# Patient Record
(demographics unavailable — no encounter records)

---

## 2020-01-01 NOTE — PRM.DC
DISCHARGE SUMMARY


DISCHARGE SUMMARY


DATE OF ADMISSION: 2020





DATE OF DISCHARGE: 2020





ADMITTING DIAGNOSES: 37 weeks gestational age baby boy





DISCHARGE DIAGNOSES: 37 weeks gestational age baby boy





HOSPITAL COURSE: Baby is a 37 weeks gestational age by Dele[place] baby boy born via

vaginal delivery to a now G4, P4 mom.  He was actually born at home and mom was 

taken to the ER and was admitted to OB where I took over the care for the baby. 

Mom had no prenatal care.  Apgars were adequate and baby actually had no acute 

issues when he was born.  I did a partial sepsis work-up on the baby and 

everything was normal.  He had initial problems with feedings in the first 24 

hours but he improved after that and has had no issues with feedings afterwards.

 Mom has a history of meth use so CPS was called and they have decided to take 

the baby in their custody.  Dr. Birmingham did do a circumcision for him.  At this 

time baby is being discharged to CPS.  24-hour bili Manuel levels were within 

normal limits and  screen was done.





DIET: Formula feeding





MEDICATIONS: None





FOLLOW-UP: Follow-up with a primary care provider within a week.











AMANDA KING MD              2020 19:50

## 2020-01-01 NOTE — DIREP
PROCEDURE:CHEST 2 VIEWS

 

COMPARISON:None.

 

INDICATIONS:Retractions

 

FINDINGS:

LUNGS/PLEURA:The lungs are hyperexpanded.  No pneumothorax, pleural effusion, 

or atelectasis.

VASCULATURE:Normal.  Unremarkable pulmonary vasculature.

CARDIAC:Normal.  No cardiac silhouette abnormality or cardiomegaly. 

MEDIASTINUM:Normal.  No visible mass or adenopathy. 

BONES:Normal.  No fracture or visible bony lesion. 

OTHER:Negative.  

 

CONCLUSION:Mild hyperexpansion of the lungs.  Otherwise, negative exam

 

 

 

Dictated by: JAY Vasquez M.D. on 2020 at 07:01 AM     

Electronically Signed By: JAY Vasquez M.D. on 2020 at 07:02 AM

## 2020-01-01 NOTE — PCM.HP
HISTORY & PHYSICAL


HISTORY & PHYSICAL


DATE: 2020





Baby is being admitted to L&D as an inpatient.





ADMITTING DIAGNOSES: [place] baby boy born around 37 weeks gestational age





ADMIT NOTE: Baby boy was born at home to a now G4, P4 mom.  Mom had no prenatal 

care.  She reports delivering at around 3 to 3:30 in the morning and she brought

her baby to the ER shortly thereafter.  Again we have no labs and no prenatal 

records at this point to review.





ALLERGIES: No known drug allergies





PHYSICAL EXAMINATION:


VITAL SIGNS: There were no recorded vital signs at this point.  It was reported 

that his respiratory rate was up to 80 when he was brought to L&D.


HEENT: Anterior fontanelle soft and flat, oropharynx is clear, moist mucous 

membranes


NECK: Supple


HEART: S1 and S2 audible


LUNGS: CTA bilaterally


ABDOMEN: Bowel sounds present, soft abdomen, three-vessel cord


EXTREMITIES: Moving all extremities well, no overt jaundice, negative hip click





LABORATORY DATA: WBC 16.1, hemoglobin 16.8, platelet count 364, Accu-Chek 66





Chest x-ray revealed no acute infiltrates, no pneumothorax





ASSESSMENT: [place] baby boy born at home who is at 37 weeks gestational age by 

Deleon score





PLAN: I will review mom's labs that are drawn now and watch her baby for a 

minimum of 48 hours.  A partial sepsis work-up has been started and I will 

continue this work-up while we are watching baby in the hospital.  Mom is 

breast-feeding and supplementing at this point.











AMANDA KING MD              2020 10:21

## 2020-01-01 NOTE — PRM.PN
Subjective


Subjective


Date:  2020


Time:  09:15


Subjective


feeding good; a bit more yellow today





VTE


VTE Risk Score


VTE Risk:


Score 0-1 = Low Risk


(Aggressive mobilization; early ambulation; no VTE prophylaxis required)


Score 2: Moderate Risk


(Intermittent/Pneumatic Compression Device OR Lovenox/Heparin/Coumadin)


Score 3-4: High Risk


(Intermittent/Pneumatic Compression Device AND Lovenox/Heparin/Coumadin)


Score  > or =5: Highest Risk


(Intermittent/Pneumatic Compression Device AND Lovenox/Heparin/Coumadin)


Antico:Hep/LMWH/Coum/Xarelto:  No


Mechanical device ordered:  No





Review of Systems


Constitutional:  No: Fever


Eyes:  No: Conjunctivae inflammation, Eyelid inflammation


ENT:  No: Nose discharge, Nose congestion


Respiratory:  No: Cough, Wheezing


Gastrointestinal:  No: Vomiting, Diarrhea


Skin:  Jaundice; No: Rash, Lesions


Neurological:  No: Seizures


Allergies:  


Coded Allergies:  


     No Known Allergies (Unverified , 20)





Objective


General:  No acute distress


HEENT:  Atraumatic, PERRLA, Mucous membr. moist/pink


Neck:  Supple


Lungs:  Clear to auscultation, Normal air movement


Heart:  Regular rate, Normal S1, Normal S2


Abdomen:  Normal bowel sounds, Soft


Extremities:  No clubbing, No cyanosis


Skin:  No rashes, No breakdown, No significant lesion


All Results(Lab/Rad)





Laboratory Tests








Test


 20


18:20 20


19:15 20


22:36 20


02:25


 


White Blood Count 18.7 10^3/uL    


 


Red Blood Count 4.72 10^6/uL    


 


Hemoglobin 18.3 g/dL    


 


Hematocrit 51.5 %    


 


Mean Corpuscular Volume 109.1 fL    


 


Mean Corpuscular Hemoglobin 38.8 pg    


 


Mean Corpuscular Hemoglobin


Concent 35.5 g/dL 


 


 


 





 


Red Cell Distribution Width 16.9 %    


 


Platelet Count 379 10^3/uL    


 


Mean Platelet Volume 9.8 fL    


 


Neutrophils (%) (Auto) 57.8 %    


 


Lymphocytes (%) (Auto) 31.2 %    


 


Monocytes (%) (Auto) 9.0 %    


 


Neutrophils # (Auto) 10.8 10^3/uL    


 


Lymphocytes # (Auto) 5.83 10^3/uL1    


 


Monocytes # (Auto) 1.7 10^3/uL    


 


Absolute Immature Granulocyte


(auto 0.20 10^3 u/L 


 


 


 





 


Absolute Eosinophils (auto) 0.1 10^3/uL    


 


Immature Granulocytes % 1.10 %    


 


Eosinophils % 0.4 %    


 


Basophils % 0.5 %    


 


Basophils # 0.1 10^3/uL    


 


Bedside Glucose  54  59  71 


 


Test


 20


04:58 20


07:48 


 





 


Bedside Glucose 53    


 


 Total Bilirubin  4.9 mg/dL   


 


 Direct Bilirubin  0.1 mg/dL   


 


 Indirect Bilirubin  4.8 mg/dL   


 


Phenylalanine PKU 


Screen 


 . 


 


 











Current Medications








 Medications


  (Trade)  Dose


 Ordered  Sig/Tana


 Route


 PRN Reason  Start Time


 Stop Time Status Last Admin


Dose Admin


 


 Erythromycin


  (Erythromycin)  1 gm  OT


 OP


   20 06:30


 3/24/20 06:29  20 10:56





 


 Hepatitis B


 Vaccine


  (Recombivax Hb 5


 Mcg/0.5 ml Syr)  5 mcg  ONCE ONCE


 IM


   20 06:30


 20 10:58 DC  





 


 Hepatitis B


 Vaccine


  (Engerix-B 10


 Mcg/0.5 ml Ped Vl)  10 mcg  STK-MED ONCE


 IM


   20 11:47


 20 11:48 Cancel  





 


 Lidocaine HCl


  (Lidocaine 1%


 Vial)  200 mg  STK-MED ONCE


 .ROUTE


   20 11:43


 20 11:44 DC  














Course


Vitals & review Data





Laboratory Tests








Test


 20


06:27 20


06:32 20


10:26 20


18:20


 


Bedside Glucose 66   57  


 


White Blood Count  16.1 10^3/uL   18.7 10^3/uL 


 


Red Blood Count  4.30 10^6/uL   4.72 10^6/uL 


 


Hemoglobin  16.8 g/dL   18.3 g/dL 


 


Hematocrit  49.2 %   51.5 % 


 


Mean Corpuscular Volume  114.4 fL   109.1 fL 


 


Mean Corpuscular Hemoglobin  39.1 pg   38.8 pg 


 


Mean Corpuscular Hemoglobin


Concent 


 34.1 g/dL 


 


 35.5 g/dL 





 


Red Cell Distribution Width  17.2 %   16.9 % 


 


Platelet Count  364 10^3/uL   379 10^3/uL 


 


Mean Platelet Volume  9.6 fL   9.8 fL 


 


Segmented Neutrophils  60 %   


 


Lymphocytes  22 %   


 


Monocytes  15 %   


 


Absolute Eosinophils (Manual)  3 %   


 


Nucleated Red Blood Cells  6 %   


 


Platelet Estimate  ADEQUATE   


 


Platelet Morphology  NORMAL   


 


Blood Morphology Comment


 


 NORMAL


MORPHOLOGY 


 





 


Neutrophils (%) (Auto)    57.8 % 


 


Lymphocytes (%) (Auto)    31.2 % 


 


Monocytes (%) (Auto)    9.0 % 


 


Neutrophils # (Auto)    10.8 10^3/uL 


 


Lymphocytes # (Auto)    5.83 10^3/uL1 


 


Monocytes # (Auto)    1.7 10^3/uL 


 


Absolute Immature Granulocyte


(auto 


 


 


 0.20 10^3 u/L 





 


Absolute Eosinophils (auto)    0.1 10^3/uL 


 


Immature Granulocytes %    1.10 % 


 


Eosinophils %    0.4 % 


 


Basophils %    0.5 % 


 


Basophils #    0.1 10^3/uL 


 


Test


 20


19:15 20


22:36 20


02:25 20


04:58


 


Bedside Glucose 54  59  71  53 


 


Test


 20


07:48 


 


 





 


 Total Bilirubin 4.9 mg/dL    


 


 Direct Bilirubin 0.1 mg/dL    


 


 Indirect Bilirubin 4.8 mg/dL    


 


Phenylalanine PKU [place]


Screen . 


 


 


 











                               Current Medications








 Medications


  (Trade)  Dose


 Ordered  Sig/Tana


 PRN Reason  Start Time


 Stop Time Status Last Admin


 


 Erythromycin


  (Erythromycin)  1 gm  OT


   20 06:30


 3/24/20 06:29  20 10:56














Assessment/Plan


37 WGA infant boy with resolved hypoglycemia





- bili at 4.4 initially; will repeat bili this morning


- routine NB care


- CPS is involved with mom having meth use and no prenatal care











AMANDA KING MD              2020 11:13

## 2020-01-01 NOTE — PRP
DATE OF PROCEDURE:  2020



PREOPERATIVE DIAGNOSIS:  Uncircumcised male infant.



POSTOPERATIVE DIAGNOSIS:  Uncircumcised male infant.



PROCEDURE:  Circumcision with Mogen clamp.



SURGEON:  Dr. Birmingham.



ANESTHESIA:  Dorsal penile block.



BLOOD LOSS:  Minimal.



COMPLICATIONS:  None.



DISPOSITION:  Infant stable in  nursery.  The patient was brought to the

 nursery where a timeout was performed.  We proceeded to prep and drape

the sterile area in the normal fashion.  Penis was inspected and no anomalies

were noted.  Dorsal penile block was done with 1% lidocaine without epinephrine.

 Then, grasped the foreskin at the 3 and 9 o'clock position with the curved

hemostats, the straight hemostat was placed at the 12 o'clock position and the

tissue was crushed.  The Mogen clamp was placed directly underneath the 12

o'clock hemostat and closed to allow for adequate hemostasis.  The foreskin was

removed with the scalpel.  The Mogen clamp was removed and the glans protruded

through the foreskin without issue.  Minimal amount of bleeding was noted. 

Hemostasis was noted throughout.  Blunt probe was used to take down adhesions. 

The patient tolerated the procedure well and remained in the  nursery for

recovery.





______________________________

Giovana Birmingham DO



DR:  ROXANE/loco  JOB# [id number]  [id number]

DD:  2020 12:07  DT:  2020 19:41

## 2020-01-01 NOTE — PRM.PN
Subjective


Subjective


Date:  2020


Time:  10:45


Subjective


Baby is feeding well; no acute issues





VTE


VTE Risk Score


VTE Risk:


Score 0-1 = Low Risk


(Aggressive mobilization; early ambulation; no VTE prophylaxis required)


Score 2: Moderate Risk


(Intermittent/Pneumatic Compression Device OR Lovenox/Heparin/Coumadin)


Score 3-4: High Risk


(Intermittent/Pneumatic Compression Device AND Lovenox/Heparin/Coumadin)


Score  > or =5: Highest Risk


(Intermittent/Pneumatic Compression Device AND Lovenox/Heparin/Coumadin)


Antico:Hep/LMWH/Coum/Xarelto:  No


Mechanical device ordered:  No





Review of Systems


Constitutional:  No: Fever


Eyes:  No: Conjunctivae inflammation, Eyelid inflammation


ENT:  No: Nose discharge, Nose congestion


Respiratory:  No: Cough, Wheezing


Gastrointestinal:  No: Vomiting, Diarrhea


Skin:  No: Rash, Lesions, Jaundice


Neurological:  No: Seizures


Allergies:  


Coded Allergies:  


     No Known Allergies (Unverified , 20)





Objective


General:  No acute distress


HEENT:  Atraumatic, PERRLA, Mucous membr. moist/pink


Neck:  Supple


Lungs:  Clear to auscultation, Normal air movement


Heart:  Regular rate, Normal S1, Normal S2


Abdomen:  Normal bowel sounds, Soft


Extremities:  No clubbing, No cyanosis


Skin:  No rashes, No breakdown, No significant lesion


All Results(Lab/Rad)





Laboratory Tests








Test


 20


18:20 20


19:15 20


22:36 20


02:25


 


White Blood Count 18.7 10^3/uL    


 


Red Blood Count 4.72 10^6/uL    


 


Hemoglobin 18.3 g/dL    


 


Hematocrit 51.5 %    


 


Mean Corpuscular Volume 109.1 fL    


 


Mean Corpuscular Hemoglobin 38.8 pg    


 


Mean Corpuscular Hemoglobin


Concent 35.5 g/dL 


 


 


 





 


Red Cell Distribution Width 16.9 %    


 


Platelet Count 379 10^3/uL    


 


Mean Platelet Volume 9.8 fL    


 


Neutrophils (%) (Auto) 57.8 %    


 


Lymphocytes (%) (Auto) 31.2 %    


 


Monocytes (%) (Auto) 9.0 %    


 


Neutrophils # (Auto) 10.8 10^3/uL    


 


Lymphocytes # (Auto) 5.83 10^3/uL1    


 


Monocytes # (Auto) 1.7 10^3/uL    


 


Absolute Immature Granulocyte


(auto 0.20 10^3 u/L 


 


 


 





 


Absolute Eosinophils (auto) 0.1 10^3/uL    


 


Immature Granulocytes % 1.10 %    


 


Eosinophils % 0.4 %    


 


Basophils % 0.5 %    


 


Basophils # 0.1 10^3/uL    


 


Bedside Glucose  54  59  71 


 


Test


 20


04:58 20


07:48 


 





 


Bedside Glucose 53    


 


 Total Bilirubin  4.9 mg/dL   


 


 Direct Bilirubin  0.1 mg/dL   


 


 Indirect Bilirubin  4.8 mg/dL   


 


Phenylalanine PKU 


Screen 


 . 


 


 











Current Medications








 Medications


  (Trade)  Dose


 Ordered  Sig/Tana


 Route


 PRN Reason  Start Time


 Stop Time Status Last Admin


Dose Admin


 


 Erythromycin


  (Erythromycin)  1 gm  OT


 OP


   20 06:30


 3/24/20 06:29  20 10:56





 


 Hepatitis B


 Vaccine


  (Recombivax Hb 5


 Mcg/0.5 ml Syr)  5 mcg  ONCE ONCE


 IM


   20 06:30


 20 10:58 DC  





 


 Hepatitis B


 Vaccine


  (Engerix-B 10


 Mcg/0.5 ml Ped Vl)  10 mcg  STK-MED ONCE


 IM


   20 11:47


 20 11:48 Cancel  





 


 Lidocaine HCl


  (Lidocaine 1%


 Vial)  200 mg  STK-MED ONCE


 .ROUTE


   20 11:43


 20 11:44 DC  














Course


Vitals & review Data





Laboratory Tests








Test


 20


06:27 20


06:32 20


10:26 20


18:20


 


Bedside Glucose 66   57  


 


White Blood Count  16.1 10^3/uL   18.7 10^3/uL 


 


Red Blood Count  4.30 10^6/uL   4.72 10^6/uL 


 


Hemoglobin  16.8 g/dL   18.3 g/dL 


 


Hematocrit  49.2 %   51.5 % 


 


Mean Corpuscular Volume  114.4 fL   109.1 fL 


 


Mean Corpuscular Hemoglobin  39.1 pg   38.8 pg 


 


Mean Corpuscular Hemoglobin


Concent 


 34.1 g/dL 


 


 35.5 g/dL 





 


Red Cell Distribution Width  17.2 %   16.9 % 


 


Platelet Count  364 10^3/uL   379 10^3/uL 


 


Mean Platelet Volume  9.6 fL   9.8 fL 


 


Segmented Neutrophils  60 %   


 


Lymphocytes  22 %   


 


Monocytes  15 %   


 


Absolute Eosinophils (Manual)  3 %   


 


Nucleated Red Blood Cells  6 %   


 


Platelet Estimate  ADEQUATE   


 


Platelet Morphology  NORMAL   


 


Blood Morphology Comment


 


 NORMAL


MORPHOLOGY 


 





 


Neutrophils (%) (Auto)    57.8 % 


 


Lymphocytes (%) (Auto)    31.2 % 


 


Monocytes (%) (Auto)    9.0 % 


 


Neutrophils # (Auto)    10.8 10^3/uL 


 


Lymphocytes # (Auto)    5.83 10^3/uL1 


 


Monocytes # (Auto)    1.7 10^3/uL 


 


Absolute Immature Granulocyte


(auto 


 


 


 0.20 10^3 u/L 





 


Absolute Eosinophils (auto)    0.1 10^3/uL 


 


Immature Granulocytes %    1.10 % 


 


Eosinophils %    0.4 % 


 


Basophils %    0.5 % 


 


Basophils #    0.1 10^3/uL 


 


Test


 20


19:15 20


22:36 20


02:25 20


04:58


 


Bedside Glucose 54  59  71  53 


 


Test


 20


07:48 


 


 





 


 Total Bilirubin 4.9 mg/dL    


 


 Direct Bilirubin 0.1 mg/dL    


 


 Indirect Bilirubin 4.8 mg/dL    


 


Phenylalanine PKU [place]


Screen . 


 


 


 











                               Current Medications








 Medications


  (Trade)  Dose


 Ordered  Sig/Tana


 PRN Reason  Start Time


 Stop Time Status Last Admin


 


 Erythromycin


  (Erythromycin)  1 gm  OT


   20 06:30


 3/24/20 06:29  20 10:56














Assessment/Plan


37 WGA infant boy with resolved hypoglycemia





- cont to follow


- routine NB care











AMANDA KING MD              2020 12:09